# Patient Record
(demographics unavailable — no encounter records)

---

## 2024-10-24 NOTE — DISCUSSION/SUMMARY
[FreeTextEntry1] : -Orthostatic symptoms significantly improved with transition of midodrine to droxidopa. cont current dose. if sx recur will increase dose of droxidopa to 200mg TID if there is no significant hypertension. otherwise will add clonidine 0.05mg BID to aid with supine hypertension -cont fludrocortisone 0.2mg daily, eval for development of LE edema -given degree of PE and DVT, cont AC  -previously on atorvastatin, however given potential interactions, will start ezetimibe for LDL reduction in the presence of carotid atherosclerosis -cont supine hypertension precautions, adequate PO intake, compression garments

## 2024-10-24 NOTE — CARDIOLOGY SUMMARY
[de-identified] : 9/19/2024: SR, 1AVB, RBELVA [de-identified] : Alcides Dorado from 1/24/2024-1/31/2024: min HR: 65, max HR: 95, mean HR: 77, 1 episode of non-sustained SVT (likely AT) - 5 beats, PVCs < 0.01% [de-identified] : 10/14/24:  1. Technically difficult image quality.  2. Left ventricular cavity is small. Left ventricular wall thickness is normal. Left ventricular systolic function is normal with an ejection fraction of 63 % by Elizabeth's method of disks. There are no regional wall motion abnormalities seen.  3. There is mild (grade 1) left ventricular diastolic dysfunction.  4. Normal left and right atrial size.  5. Normal right ventricular cavity size, with normal wall thickness, and normal right ventricular systolic function.  6. No significant valvular disease.  7. Trileaflet aortic valve with normal systolic excursion.  8. No pericardial effusion seen. TTE from 1/17/2024: EF 68%. No regional wall abnormalities. Normal left and atrial size. Normal LV diastolic function. No significant valvular disease.  [de-identified] : Abbott ILR implanted 3/13/2024 [de-identified] : cath 5/2022 - minor luminal irregularities [de-identified] : 1/29/24: 1. Mild IWT bilaterally \T\ mild heterogeneous plaque (16-49%) visualized at bilateral BULB, ECA prox \T\ ICA prox with no evidence of hemodynamically significant stenosis. 2. Vertebral arteries: antegrade flow bilaterally.

## 2024-10-24 NOTE — REVIEW OF SYSTEMS
ANTICOAGULATION THERAPY EDUCATION   PATIENT  INSTRUCTION    Your Guide to Using Warfarin:  Please refer to this handout for questions regarding your medication, Coumadin/Warfarin. This handout includes information on dosing, blood testing, possible side effects of Coumadin/Warfarin, using other medications, dietary guidelines and safety concerns while on anticoagulation therapy.    Please contact your primary care physician and/or seek appropriate medical care if you experience:  · A serious fall  · Increased menstrual bleeding   · An injury to your head  · Notice a different color urine or stool   · Increased bleeding with teeth brushing   · If you have any other unusual bruising   · Have bleeding from your nose or a cut that doesn't stop bleeding         Call your physician immediately if you notice any signs and symptoms of a blood clot such as:  · Sudden weakness in any limb  · Dizziness or faintness   · Numbness or tingling anywhere  · New shortness of breath or chest pain   · Sudden onset of slurred speech or inability to speak  · New pain, swelling, redness or heat in any extremity   · Visual changes or loss of sight in either eye         Other factors that may affect your anticoagulation therapy include:  · Fever  · Stress   · Diarrhea  · Changes in exercise/activity level   · Vomiting         While on Anticoagulation therapy avoid:  · Pregnancy, using birth control and hormone replacement therapy    · Body piercing or tattoos      Please inform family members and other health care providers that you are on anticoagulation therapy. Make sure to carry an up-to-date medication list. You can also wear a medical alert bracelet to identify that you are on anticoagulation therapy.    If you are utilizing an injectable anticoagulation medication you should be aware of how and where the medication should be injected and how to appropriately dispose of the injection needles (sharps).    Additional patient  education materials provided to you:  · Important Facts about your Coumadin (color handout)  · Vitamin K Food List  · Travel Fitness Tips for Patients on Coumadin  · Prevention and Treatment of Nosebleeds  · When To Call Your Physician  · Potential and Documented Interaction of Herbs with Coumadin/Warfarin  · Lab hours for [facility]   [Joint Pain] : joint pain [Negative] : Heme/Lymph

## 2024-10-24 NOTE — CARDIOLOGY SUMMARY
[de-identified] : 9/19/2024: SR, 1AVB, RBELVA [de-identified] : Alcides Dorado from 1/24/2024-1/31/2024: min HR: 65, max HR: 95, mean HR: 77, 1 episode of non-sustained SVT (likely AT) - 5 beats, PVCs < 0.01% [de-identified] : 10/14/24:  1. Technically difficult image quality.  2. Left ventricular cavity is small. Left ventricular wall thickness is normal. Left ventricular systolic function is normal with an ejection fraction of 63 % by Elizabeth's method of disks. There are no regional wall motion abnormalities seen.  3. There is mild (grade 1) left ventricular diastolic dysfunction.  4. Normal left and right atrial size.  5. Normal right ventricular cavity size, with normal wall thickness, and normal right ventricular systolic function.  6. No significant valvular disease.  7. Trileaflet aortic valve with normal systolic excursion.  8. No pericardial effusion seen. TTE from 1/17/2024: EF 68%. No regional wall abnormalities. Normal left and atrial size. Normal LV diastolic function. No significant valvular disease.  [de-identified] : Abbott ILR implanted 3/13/2024 [de-identified] : cath 5/2022 - minor luminal irregularities [de-identified] : 1/29/24: 1. Mild IWT bilaterally \T\ mild heterogeneous plaque (16-49%) visualized at bilateral BULB, ECA prox \T\ ICA prox with no evidence of hemodynamically significant stenosis. 2. Vertebral arteries: antegrade flow bilaterally.

## 2024-10-24 NOTE — HISTORY OF PRESENT ILLNESS
[FreeTextEntry1] : 83F HLD, orthostatic hypotension, schizoaffective disorder, dementia, syncope (s/p Abbott ILR 3/13/24), moderate carotid atherosclerosis who presents for post-hospitalization follow-up.  Pt admitted to [facility] 12/23/23-12/27/23 for syncope while off midodrine and fludrocortison, found to have extensive b/l PE and transferred to [facility], found to also have R soleal DVT. Went to [facility] 1/16/24 for syncope without prodrome. Had ILR placed for this  Admitted to [facility] 10/12/24-10/16/24 for tremors, difficulty ambulating sx appear to be due to worsening orthostatic hypotension  was on midodrine 10mg BID and fludrocortisone 0.2mg daily initial thought of inappropriate chronotropy with standing (i.e. no orthostatic tachycardia to help compensate with orthostatic hypotension),  however HR noted to go up with pt moving in bed and ambulating. unable to safely perform changed midodrine 10mg TID to droxidopa 100mg TID with improvement serum immunofixation wnl, K/L ratio mildly elevated  feels significantly better she still has mild orthostatic hypotension after three minutes of standing butt not immediately and not after five minutes there is no dizziness, no LOC, no exertional dizziness like prior No significant hypertension   9/17/24: Chol 205//HDL 64/

## 2024-10-24 NOTE — PHYSICAL EXAM
[Well Developed] : well developed [Well Nourished] : well nourished [No Acute Distress] : no acute distress [Normal Conjunctiva] : normal conjunctiva [Normal Venous Pressure] : normal venous pressure [No Carotid Bruit] : no carotid bruit [Normal S1, S2] : normal S1, S2 [No Murmur] : no murmur [No Rub] : no rub [No Gallop] : no gallop [Clear Lung Fields] : clear lung fields [Good Air Entry] : good air entry [No Respiratory Distress] : no respiratory distress  [Soft] : abdomen soft [Non Tender] : non-tender [Abnormal Gait] : abnormal gait [No Edema] : no edema [No Cyanosis] : no cyanosis [No Rash] : no rash [No Skin Lesions] : no skin lesions [Moves all extremities] : moves all extremities [No Focal Deficits] : no focal deficits

## 2024-10-24 NOTE — HISTORY OF PRESENT ILLNESS
[FreeTextEntry1] : 83F HLD, orthostatic hypotension, schizoaffective disorder, dementia, syncope (s/p Abbott ILR 3/13/24), moderate carotid atherosclerosis who presents for post-hospitalization follow-up.  Pt admitted to [facility] 12/23/23-12/27/23 for syncope while off midodrine and fludrocortison, found to have extensive b/l PE and transferred to [facility], found to also have R soleal DVT. Went to [facility] 1/16/24 for syncope without prodrome. Had ILR placed for this  Admitted to [facility] 10/12/24-10/16/24 for tremors, difficulty ambulating sx appear to be due to worsening orthostatic hypotension  was on midodrine 10mg BID and fludrocortisone 0.2mg daily initial thought of inappropriate chronotropy with standing (i.e. no orthostatic tachycardia to help compensate with orthostatic hypotension),  however HR noted to go up with pt moving in bed and ambulating. unable to safely perform changed midodrine 10mg TID to droxidopa 100mg TID with improvement serum immunofixation wnl, K/L ratio mildly elevated  feels significantly better she still has mild orthostatic hypotension after three minutes of standing butt not immediately and not after five minutes there is no dizziness, no LOC, no exertional dizziness like prior No significant hypertension   9/17/24: Chol 205//HDL 64/

## 2024-11-18 NOTE — PHYSICAL EXAM
[Cranial Nerves Oculomotor (III)] : extraocular motion intact [Cranial Nerves Facial (VII)] : face symmetrical [Motor Strength] : muscle strength was normal in all four extremities [1+] : Patella left 1+ [FreeTextEntry1] : Patient is reserved. Follows commands. Speaks softly. Has mild masked facies. EOMI. There are no resting tremors in her limbs. There is + asterixis in her hands with 1+ KT B/L. there is slowing of her movements but no decrementation of limb rigidity. There is absence of dysmetria. Upon standing, there is absence of OM. She walks with good cj and mildly reduced SL for her age. Armswing is symmetric. Postural reflexes intact  Home video reviewed: Pt has negative myoclonus when arms are raised and on standing

## 2024-11-18 NOTE — HISTORY OF PRESENT ILLNESS
[FreeTextEntry1] : 83F with hx of schizoaffective d/o (diagnosed in her 30s), dementia, OH, PE (12/2023 on eliquis; OH started after the PE) who is seeing me for jerky movements of her extremities. She is accompanied by her daughter who provides the history. The movements started in June and involved her arms and legs. When walking she would have unpredictable episodes of loss of balance and sometimes fall as a result. She would drop things for her hands and have loss of tone when arms were elevated. These episodes improved following a reduction on clozapine and addition of invega. However, these symptoms recurred 2 weeks ago and occur primarily overnight and in the morning. AFter the late morning hours, they no longer occur. In the mornings, she needs considerable help getting out of bed and transferring due to the involuntary movements. Of note patient has been getting ECT since Oct. Patient was also on low dose klonopin hs that was discontinued in June as well.   Nonmotor; no UI no RBD  Meds clozapine 150mg qAM, 125mg pm droxdiopa 100mg TID invega 6mg qhs effexor 75mg qAM mentadine 10mg qd florinef elquis

## 2024-11-18 NOTE — DISCUSSION/SUMMARY
[FreeTextEntry1] : 83F with involuntary movements that are rapid and involve the arms and legs .Exam is c/w myoclonus (asterixis and OM). Suspect that neuroleptics are probable etiologies. especially clozapine. The nocturnal and morning occurrences provide further support for this possibility. The discontinuation of klonopin may have also masked these symptoms.   Patient was counseled on the following recommendations: will start klonopin 0.25mg hs f/u psych will check autoimmune panel including esr/crp/paraneoplastic panel/ammonia MRI brain wwo contrast rEEG to r/o epileptic focus- my suspicion is low for this  f/u 3-4 months

## 2024-12-05 NOTE — PHYSICAL EXAM
[Alert] : alert [Sclera] : the sclera and conjunctiva were normal [EOMI] : extraocular movements were intact [PERRL] : pupils were equal in size, round, and reactive to light [Normal Outer Ear/Nose] : the ears and nose were normal in appearance [Supple] : the neck was supple [No Respiratory Distress] : no respiratory distress [Respiration, Rhythm And Depth] : normal respiratory rhythm and effort [Auscultation Breath Sounds / Voice Sounds] : lungs were clear to auscultation bilaterally [Normal S1, S2] : normal S1 and S2 [Heart Rate And Rhythm] : heart rate was normal and rhythm regular [Edema] : edema was not present [Normal Appearance] : normal in appearance [Breast Palpation Mass] : no palpable masses [No Nipple Discharge] : no nipple discharge [Bowel Sounds] : normal bowel sounds [Abdomen Tenderness] : non-tender [Abdomen Soft] : soft [Normal Color / Pigmentation] : normal skin color and pigmentation [Sensation] : the sensory exam was normal to light touch and pinprick [No Focal Deficits] : no focal deficits [Normal Mood] : the mood was normal [Normal Hearing] : hearing was not normal

## 2024-12-05 NOTE — REVIEW OF SYSTEMS
[Feeling Poorly] : feeling poorly [Feeling Tired] : feeling tired [Loss Of Hearing] : hearing loss [Constipation] : constipation [Incontinence] : incontinence [Arthralgias] : arthralgias [Confused] : confusion [Dizziness] : dizziness [Fever] : no fever [Chills] : no chills [Eye Pain] : no eye pain [Palpitations] : no palpitations [Leg Claudication] : no intermittent leg claudication [Lower Ext Edema] : no lower extremity edema [Cough] : no cough [SOB on Exertion] : no shortness of breath during exertion [Abdominal Pain] : no abdominal pain [Dysuria] : no dysuria [Skin Lesions] : no skin lesions

## 2024-12-05 NOTE — ASSESSMENT
[FreeTextEntry1] : # Pulmonary Embolism - unsure of possible cause (negative hypercoagulable work up) - c/w AC 2.5mg BID due to severe PE and DVT - no episodes of bleeding reported - had recent loop recorder implanted  # Pre DM - HbA1C remains stable - c/w Mariela  # Schizoaffective disorder  - mood better  - currently taking Clozapine 200mg in the AM and 100mg at night - she is also taking Invega 4.5mg at night and doing ECT every couple of weeks - following with psychiatry Dr. Miguel   # Unsteady gait - Home PT referral given  # Health care maintenance - PNA vaccine given - has received influenza vaccine - EKG repeated showing no acute changes  - advised to get COVID booster  f/u in 3 months for follow up and possible repeat blood work.  She is medically cleared to proceed with ECT

## 2024-12-05 NOTE — HISTORY OF PRESENT ILLNESS
[Any fall with injury in past year] : Patient reported fall with injury in the past year [Independent] : transferring/mobility [] : Patient is continent. [Completely Dependent] : Completely dependent. [NO] : No [0] : 2) Feeling down, depressed, or hopeless: Not at all (0) [PHQ-2 Negative - No further assessment needed] : PHQ-2 Negative - No further assessment needed [FreeTextEntry1] : 83-year-old female with past medical history of schizoaffective disorder, dementia, hyperlipidemia, prior DVT/PE on Eliquis (Dec 2023), prior syncope due to orthostatic hypotension, s/p loop recorder (3/14/24) presents with a fall. Patient has been complaining of dizziness/rom spinning, constant, worse when lying down.   Since last visit pt developed episode of "hand shakiness", concern of being attributed to SE of medications for schizoaffective disorder. Therefore, Invega was reduced from 6 to 4.5mg at night. She was also started on Clonidine 0.25mg at HS daughter reports has been tolerating well. She has upcoming repeat ECT session next Wednesday.   She continues to take Eliquis 2.5mg BID. She continues to follow up with Cardiology.  Pt also complains of occasional pain in her neck, both knees and lower back. She takes Tylenol on occasion; she started to participate with PT. She denies any falls since last visit.    Her daughter has been assisting with her care at home, she recently flew in from [place].    [[id number]] : 0

## 2025-04-02 NOTE — HISTORY OF PRESENT ILLNESS
[FreeTextEntry1] : VIN DANGELO is a 82 year old F w/ pmhx of Dr. Zamarripa, w/ MHx CAD, HLD, Hypotension, PreDM, SAD, Dementia, GERD, Obesity who presents today for routine follow up accompanied by her daughter Dr. Zamarripa. The patient denies fever, chills, sore throat, loss of taste or smell, muscle aches weight loss, malaise, rash, recent travel, insect bites, alteration bowel habits, headaches, weakness, abdominal pain, bloating, changes in urination, visual disturbances, shortness of breath, chest pain, dizziness, palpitations.  The patient presents for routine follow up of their coronary artery disease. The patient has been following all secondary prevents measures and antiplatelet therapy. The patient denies shortness of breath, chest pain, dizziness, palpitations.  The patient is here for follow-up of elevated cholesterol. Patient is currently tolerating medication and denies muscle pain, joint pain, back pain, urinary changes , nausea, vomiting, abdominal pain or diarrhea. The patient is trying to follow a low cholesterol diet.